# Patient Record
(demographics unavailable — no encounter records)

---

## 2025-03-03 NOTE — RESULTS/DATA
[Hyperventilation] : Hyperventilation for 3 minutes produced generalized slowing. [Normal] : A normal interictal EEG does not exclude nor support the diagnosis of epilepsy. [FreeTextEntry2] : There was a symmetric, medium-voltage 10 Hz posterior dominant rhythm that attenuated with eye opening.

## 2025-03-03 NOTE — QUALITY MEASURES
pt received in radiology suite awake alert w/ c/o back pain. +room air [Seizure frequency] : Seizure frequency: Yes [Etiology, seizure type, and epilepsy syndrome] : Etiology, seizure type, and epilepsy syndrome: Yes [Side effects of anti-seizure medications] : Side effects of anti-seizure medications: Yes [Safety and education around seizures] : Safety and education around seizures: Yes [Issues around driving] : Issues around driving: Yes [Screening for anxiety, depression] : Screening for anxiety, depression: Yes [Treatment-resistant epilepsy (every visit)] : Treatment-resistant epilepsy (every visit): Yes [Adherence to medication(s)] : Adherence to medication(s): Yes [25 Hydroxy Vitamin D level assessed and Vitamin D3 ordered] : 25 Hydroxy Vitamin D level assessed and Vitamin D3 ordered: Yes [Counseling for women of childbearing potential with epilepsy (including folic acid supplement)] : Counseling for women of childbearing potential with epilepsy (including folic acid supplement): Not Applicable [Options for adjunctive therapy (Neurostimulation, CBD, Dietary Therapy, Epilepsy Surgery)] : Options for adjunctive therapy (Neurostimulation, CBD, Dietary Therapy, Epilepsy Surgery): Not Applicable

## 2025-03-03 NOTE — CONSULT LETTER
[Dear  ___] : Dear  [unfilled], [Consult Letter:] : I had the pleasure of evaluating your patient, [unfilled]. [Please see my note below.] : Please see my note below. [Consult Closing:] : Thank you very much for allowing me to participate in the care of this patient.  If you have any questions, please do not hesitate to contact me. [Sincerely,] : Sincerely, [FreeTextEntry3] : EDENILSON Mancera\par  Certified Pediatric Nurse Practitioner\par  Pediatric Neurology\par  \par  Alexis Briggs MD\par  Pediatric Neurology\par  \par  [facility]\par  [address]  [address] \[place] [address] \par  (T) [phone] \par  (F) [phone]

## 2025-03-03 NOTE — HISTORY OF PRESENT ILLNESS
[Home] : at home, [unfilled] , at the time of the visit. [Medical Office: ([facility])___] : at the medical office located in  [Mother] : mother [Verbal consent obtained from patient] : the patient, [unfilled] [FreeTextEntry1] : 6/23/2017: with moth. 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 Had a convulsive seizure in school two years ago. EEG left focal spike. CT scan normal at that time. Remained seizure free on Keppra 600mg BID. MRI brain done in [facility] was reportedly normal. Mother will fax the report to the office. AEEG on 6/5/2017: bifrontal spikes, left frontal spikes (report by Dr. Bradley).    10/31/2017: with mother. Last week had a 20-30 seconds episode of staring while playing basket ball.  Oxcar. level was 11. MRI 3/18/15  limited normal in [facility]  1/31/2018: with mother. Had a convulsive seizure in school on 12/22/17. Trileptal was increased to 900mg BID. EEG: generalized spikes. Was focal  prior EEG   03/28/2018 with parents; Patient was in ED on 03/24/2018. Patient found on floor in his bedroom with generalized shaking and foaming at the mouth, unresponsive- lasted about 2 minutes. After seizure patient was sleepy (it was 12 midnight) and was responding once ambulance came. Took blood work in ED, seen by Dr. Aragon. Parents were concerned patient hit his head because was found near a dresser in his room. On oxcarbazepine 600 mg tabs 1 tab twice a day and 150 mg tab 2 tabs twice a day. Last seizure before this episode was in December 2017.  4/24/2018: with mother. Seizure free since last visit.On Depakote 500mg , 2 tablets am, Oxcarbazepine 900mg BID. No physical complaints.   7/25/2018: with mother. Seizure free since 3/2018. On Depakote 500mg , 1 tablet BIDm, Oxcarbazepine 900mg BID. No physical complaints.   1/30/2019: with mother. Seizure free since 3/2018. On Depakote 500mg , 1 tablet BIDm, Oxcarbazepine 900mg BID. No physical complaints.   7/3/19- with mother; Seizure free since 3/2018. On Depakote 500mg BID and Oxcarbazepine 900mg BID. no side effects reported. No physical complaints. He will be going to college this coming year for accounting.   1/2/20- with mother; Seizure free since 3/2018. On Depakote 500mg BID and Oxcarbazepine 900mg BID. no side effects reported. No physical complaints. After last EEG still showed spikes and was only no OXC, we then started Depakote (in March 2018) and he has been doing well since. Will need a repeat REEG to see if spikes are still there. Currently in college going for accounting and is doing well.  8/18/20- with mother; has been doing well, no seizures since March 2018. Will be going to college in 1 week and is doing well. On Depakote 500mg BID and Oxcarbazepine 900mg BID. No side effects reported from meds. Has headaches about once a month and with Tylenol or Motrin he will feel better.   11/25/20- with mother via TEB- has not had any seizure since 3/2018. Is currently in College going for accounting. is doing well on meds. Taking Depakote 500mg BID and Oxcarbazepine 900mg BID. Headaches have been better.  1/28/21- with mother via TEB-  Successfully weaned  off OXC without issues. Now only on Depakote 500 mg BID. Tolerating well, no side effects reported. No reported headaches. Doing well in college. Learning remotely due to COVID. No seizures since 3/2018.  8/19/21- with mother via TEB- Successfully weaned  off OXC without issues. Now only on Depakote 500 mg BID. Tolerating well, no side effects reported. No reported headaches. Doing well in college.  No seizures since 3/2018  12/29/2021 with mother via Telehealth - Successfully weaned  off OXC without issues. Now only on Depakote 500 mg BID. Tolerating well, no side effects reported. No reported headaches. Doing well in college.  No seizures since 3/2018. AEEG and REEG in 2020 were normal. Altogether he had 3 convulsive seizures.   7/8/2022 with mother Successfully weaned  off OXC without issues. Now only on Depakote 500 mg BID. Tolerating well, no side effects reported. No reported headaches. Doing well in college.  No seizures since 3/2018. AEEG and REEG in 2020 were normal. Altogether he had 3 convulsive seizures.   8/9/2022 with mother in a Telehehealth visit. Remains seizure free on Depakote 500mg BID. AEEG 7/15 was normal.      2/6/2023  with mother. Mickey has been off Meds since late summer 2022. On 2/4/23 while in the car with friends he had a convulsive seizure. In a local hospital he was loaded with VPA and was started on VPA 500mg BID. Has been well since   11/30/2023 with [facility]. Remains seizure free on VPA 500mg BID. Seen by nephrology for glucose, protein in the urine. Last seizure was 2/2023 2/5/2024  with the MOC. Last seizure as reported by her was on 1/30/2023. Now on VPA 500mg BID.   9/16/2024  in person visit. Remains seizure free on VPA 500mg BID.  3/3/2025  with his mother in person visit .Seizure free on VPA 500mg BID., Seizure recurrence in 2023 when he was off the VPA.  [FreeTextEntry3] : Mother

## 2025-03-03 NOTE — REASON FOR VISIT
[Follow-Up Evaluation] : a follow-up evaluation for [Seizure Disorder] : seizure disorder [Patient] : patient [Mother] : mother [Medical Records] : medical records [Ambulatory, without video] : Ambulatory, without video [Start: ___] : Recording Start Time: [unfilled] [End: ___] :    Recording End Time: [unfilled] [FreeTextEntry2] : 20 years old [FreeTextEntry1] : Dr. Briggs

## 2025-03-03 NOTE — DATA REVIEWED
[FreeTextEntry1] : 2/5/18- MRI Brain- punctate nonspecific T2 hyperintensities seen primarily within the frontal and paritel lobe white matter. Otherwise, normal for age MRI of the brain without contrast. Seizure focus not elucidated on this MRI Brain.\par  \par  1/9/18- Prolonged EEG- abnormal- occasional 1-2 seconds bursts of irregular generalized spike and slow wave complexes\par  \par  1/8/20- REEG- normal\par  \par  7/30/20- AEEG- normal

## 2025-03-03 NOTE — REVIEW OF SYSTEMS
[Patient Intake Form Reviewed] : patient intake form reviewed [Normal] : Hematologic/Lymphatic [Headache] : no headache [FreeTextEntry8] : see HPI [de-identified] : PDD

## 2025-03-03 NOTE — PHYSICAL EXAM
[Well-appearing] : well-appearing [Normocephalic] : normocephalic [No dysmorphic facial features] : no dysmorphic facial features [No ocular abnormalities] : no ocular abnormalities [Neck supple] : neck supple [No abnormal neurocutaneous stigmata or skin lesions] : no abnormal neurocutaneous stigmata or skin lesions [No deformities] : no deformities [Alert] : alert [Well related, good eye contact] : well related, good eye contact [Conversant] : conversant [Normal speech and language] : normal speech and language [Follows instructions well] : follows instructions well [Pupils reactive to light and accommodation] : pupils reactive to light and accommodation [Full extraocular movements] : full extraocular movements [No nystagmus] : no nystagmus [No papilledema] : no papilledema [Normal facial sensation to light touch] : normal facial sensation to light touch [No facial asymmetry or weakness] : no facial asymmetry or weakness [Gross hearing intact] : gross hearing intact [Equal palate elevation] : equal palate elevation [Normal tongue movement] : normal tongue movement [Midline tongue, no fasciculations] : midline tongue, no fasciculations [Normal axial and appendicular muscle tone] : normal axial and appendicular muscle tone [Normal finger tapping and fine finger movements] : normal finger tapping and fine finger movements [No abnormal involuntary movements] : no abnormal involuntary movements [Walks and runs well] : walks and runs well [Knee jerks] : knee jerks [No ankle clonus] : no ankle clonus [Bilaterally] : bilaterally [Localizes LT and temperature] : localizes LT and temperature [Good walking balance] : good walking balance [Normal gait] : normal gait [de-identified] : not in respiratory distress

## 2025-03-03 NOTE — BIRTH HISTORY
[At Term] : at term [place] [Normal Vaginal Route] : by normal vaginal route [None] : there were no delivery complications [Age Appropriate] : age appropriate developmental milestones met [Speech Delay w/ Normal Development] : patient has speech delay with normal development

## 2025-03-26 NOTE — HISTORY OF PRESENT ILLNESS
[FreeTextEntry1] : Pt here for follow up Since last visit saw Neurology March 2025 No changes in medications made  No foamy urine no bubbly urine Hydrates well- 3 bottles half liter a day NO gross hematuria NO difficulty urination NO burning or pain with urination NO SOB, no leg swelling Other ROS neg  Meds: Allegra PRN no herbal medication no OTC no supplements  Divalproex

## 2025-03-26 NOTE — ASSESSMENT
[FreeTextEntry1] : 23 y.o M with PMHx of epilepsy and autism here for coincidental proteinuria and glucosuria being found on his UA done at the PCP office. Pt on divalproex for past 5 years.  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  # renal US-structural abnormalities 10-21-23 Right kidney: 10.8 cm. No renal mass, hydronephrosis or calculi. Left kidney: 10.6 cm. No renal mass, hydronephrosis or calculi. Urinary bladder: Within normal limits. Small/44 cc post void residual volume (PVR). IMPRESSION: Normal renal ultrasound.  # Patti testing last visit declined by patient; always an option if pt wishes in the future   Pt prefers I call mother with results

## 2025-03-26 NOTE — PHYSICAL EXAM
[General Appearance - Alert] : alert [General Appearance - Well Nourished] : well nourished [General Appearance - In No Acute Distress] : in no acute distress [General Appearance - Well Developed] : well developed [Sclera] : the sclera and conjunctiva were normal [Outer Ear] : the ears and nose were normal in appearance [Neck Appearance] : the appearance of the neck was normal [Respiration, Rhythm And Depth] : normal respiratory rhythm and effort [] : no respiratory distress [Auscultation Breath Sounds / Voice Sounds] : lungs were clear to auscultation bilaterally [Apical Impulse] : the apical impulse was normal [Heart Rate And Rhythm] : heart rate was normal and rhythm regular [Heart Sounds] : normal S1 and S2 [Edema] : there was no peripheral edema [Bowel Sounds] : normal bowel sounds [No CVA Tenderness] : no ~M costovertebral angle tenderness [Abnormal Walk] : normal gait [Skin Color & Pigmentation] : normal skin color and pigmentation [No Focal Deficits] : no focal deficits [Oriented To Time, Place, And Person] : oriented to person, place, and time [Impaired Insight] : insight and judgment were intact [Affect] : the affect was normal

## 2025-07-25 NOTE — HISTORY OF PRESENT ILLNESS
[FreeTextEntry1] : annual physical [de-identified] : IGNACIO GOODMAN is a 23 year old male with history of Epilepsy, Autism who presents for annual comprehensive exam. Overall he feels pretty well today. Had a stye in right lower lid, on Tobradex from ophthalmologist. He is now working full time as an , hybrid schedule, has to commute to the city 1-3x/week depending on time of year. Enjoys the work environment.  Has been seizure-free since 2023, follows regularly with neurologist Dr. Briggs. Compliant with med.  He keeps very active, goes to the gym with a  weekly and also goes for walks other days. Working on diet--doing intermittent fasting. Would like to cut down on fried foods.

## 2025-07-25 NOTE — PLAN
[FreeTextEntry1] : Well visit with no abnormal findings on exam.  Labs drawn in office  #Epilepsy: last seizure in 2023, on Divalproex 500 mg BID with good compliance.   Will f/u labs.  Healthy diet, exercise reviewed Immunizations reviewed--annal Flu, COVID19 booster

## 2025-07-25 NOTE — PHYSICAL EXAM
[Declined Rectal Exam] : declined rectal exam [Normal] : affect was normal and insight and judgment were intact [de-identified] : declined  exam

## 2025-07-25 NOTE — HEALTH RISK ASSESSMENT
[Excellent] : ~his/her~  mood as  excellent [No] : In the past 12 months have you used drugs other than those required for medical reasons? No [No falls in past year] : Patient reported no falls in the past year [0] : 2) Feeling down, depressed, or hopeless: Not at all (0) [PHQ-2 Negative - No further assessment needed] : PHQ-2 Negative - No further assessment needed [HIV test declined] : HIV test declined [Hepatitis C test declined] : Hepatitis C test declined [None] : None [With Family] : lives with family [College] : College [Single] : single [Feels Safe at Home] : Feels safe at home [Fully functional (bathing, dressing, toileting, transferring, walking, feeding)] : Fully functional (bathing, dressing, toileting, transferring, walking, feeding) [Fully functional (using the telephone, shopping, preparing meals, housekeeping, doing laundry, using] : Fully functional and needs no help or supervision to perform IADLs (using the telephone, shopping, preparing meals, housekeeping, doing laundry, using transportation, managing medications and managing finances) [Reports normal functional visual acuity (ie: able to read med bottle)] : Reports normal functional visual acuity [Smoke Detector] : smoke detector [Carbon Monoxide Detector] : carbon monoxide detector [Safety elements used in home] : safety elements used in home [Seat Belt] :  uses seat belt [Sunscreen] : uses sunscreen [Never] : Never [NO] : No [Employed] : employed [de-identified] : as above [de-identified] : as above [[id number]] : 0 [Change in mental status noted] : No change in mental status noted [Language] : denies difficulty with language [Behavior] : denies difficulty with behavior [Learning/Retaining New Information] : denies difficulty learning/retaining new information [Handling Complex Tasks] : denies difficulty handling complex tasks [Reasoning] : denies difficulty with reasoning [Spatial Ability and Orientation] : denies difficulty with spatial ability and orientation [Sexually Active] : not sexually active [Reports changes in hearing] : Reports no changes in hearing [Reports changes in vision] : Reports no changes in vision [Reports changes in dental health] : Reports no changes in dental health [TB Exposure] : is not being exposed to tuberculosis [FreeTextEntry2] :